# Patient Record
Sex: FEMALE | Race: BLACK OR AFRICAN AMERICAN
[De-identification: names, ages, dates, MRNs, and addresses within clinical notes are randomized per-mention and may not be internally consistent; named-entity substitution may affect disease eponyms.]

---

## 2023-05-10 ENCOUNTER — HOSPITAL ENCOUNTER (EMERGENCY)
Dept: HOSPITAL 26 - MED | Age: 23
Discharge: HOME | End: 2023-05-10
Payer: COMMERCIAL

## 2023-05-10 ENCOUNTER — HOSPITAL ENCOUNTER (EMERGENCY)
Dept: HOSPITAL 26 - MED | Age: 23
Discharge: LEFT BEFORE DISCHARGE | End: 2023-05-10
Payer: COMMERCIAL

## 2023-05-10 VITALS — DIASTOLIC BLOOD PRESSURE: 53 MMHG | SYSTOLIC BLOOD PRESSURE: 106 MMHG

## 2023-05-10 VITALS — SYSTOLIC BLOOD PRESSURE: 100 MMHG | DIASTOLIC BLOOD PRESSURE: 66 MMHG

## 2023-05-10 VITALS — WEIGHT: 131 LBS | HEIGHT: 68 IN | BODY MASS INDEX: 19.85 KG/M2

## 2023-05-10 VITALS — SYSTOLIC BLOOD PRESSURE: 100 MMHG | DIASTOLIC BLOOD PRESSURE: 62 MMHG

## 2023-05-10 VITALS — BODY MASS INDEX: 19.85 KG/M2 | WEIGHT: 131 LBS | HEIGHT: 68 IN

## 2023-05-10 VITALS — DIASTOLIC BLOOD PRESSURE: 66 MMHG | SYSTOLIC BLOOD PRESSURE: 100 MMHG

## 2023-05-10 DIAGNOSIS — R11.2: Primary | ICD-10-CM

## 2023-05-10 DIAGNOSIS — Z79.899: ICD-10-CM

## 2023-05-10 DIAGNOSIS — F12.90: ICD-10-CM

## 2023-05-10 DIAGNOSIS — R10.9: ICD-10-CM

## 2023-05-10 LAB
ALBUMIN FLD-MCNC: 4.4 G/DL (ref 3.4–5)
ANION GAP SERPL CALCULATED.3IONS-SCNC: 15.9 MMOL/L (ref 8–16)
AST SERPL-CCNC: 27 U/L (ref 15–37)
BASOPHILS # BLD AUTO: 0 K/UL (ref 0–0.22)
BASOPHILS NFR BLD AUTO: 0.3 % (ref 0–2)
BILIRUB SERPL-MCNC: 0.5 MG/DL (ref 0–1)
BUN SERPL-MCNC: 6 MG/DL (ref 7–18)
CHLORIDE SERPL-SCNC: 103 MMOL/L (ref 98–107)
CO2 SERPL-SCNC: 23.8 MMOL/L (ref 21–32)
CREAT SERPL-MCNC: 0.9 MG/DL (ref 0.6–1.3)
EOSINOPHIL # BLD AUTO: 0 K/UL (ref 0–0.4)
EOSINOPHIL NFR BLD AUTO: 0 % (ref 0–4)
ERYTHROCYTE [DISTWIDTH] IN BLOOD BY AUTOMATED COUNT: 14 % (ref 11.6–13.7)
GFR SERPL CREATININE-BSD FRML MDRD: 102 ML/MIN (ref 90–?)
GLUCOSE SERPL-MCNC: 99 MG/DL (ref 74–106)
HCT VFR BLD AUTO: 38.1 % (ref 36–48)
HGB BLD-MCNC: 12.8 G/DL (ref 12–16)
LIPASE SERPL-CCNC: 105 U/L (ref 73–393)
LYMPHOCYTES # BLD AUTO: 1.3 K/UL (ref 2.5–16.5)
LYMPHOCYTES NFR BLD AUTO: 14.5 % (ref 20.5–51.1)
MCH RBC QN AUTO: 30 PG (ref 27–31)
MCHC RBC AUTO-ENTMCNC: 34 G/DL (ref 33–37)
MCV RBC AUTO: 89.7 FL (ref 80–94)
MONOCYTES # BLD AUTO: 0.3 K/UL (ref 0.8–1)
MONOCYTES NFR BLD AUTO: 3.8 % (ref 1.7–9.3)
NEUTROPHILS # BLD AUTO: 7.4 K/UL (ref 1.8–7.7)
NEUTROPHILS NFR BLD AUTO: 81.4 % (ref 42.2–75.2)
PLATELET # BLD AUTO: 211 K/UL (ref 140–450)
POTASSIUM SERPL-SCNC: 3.7 MMOL/L (ref 3.5–5.1)
RBC # BLD AUTO: 4.25 MIL/UL (ref 4.2–5.4)
SODIUM SERPL-SCNC: 139 MMOL/L (ref 136–145)
WBC # BLD AUTO: 9.1 K/UL (ref 4.8–10.8)

## 2023-05-10 PROCEDURE — 80053 COMPREHEN METABOLIC PANEL: CPT

## 2023-05-10 PROCEDURE — 99284 EMERGENCY DEPT VISIT MOD MDM: CPT

## 2023-05-10 PROCEDURE — 36415 COLL VENOUS BLD VENIPUNCTURE: CPT

## 2023-05-10 PROCEDURE — 96374 THER/PROPH/DIAG INJ IV PUSH: CPT

## 2023-05-10 PROCEDURE — 85025 COMPLETE CBC W/AUTO DIFF WBC: CPT

## 2023-05-10 PROCEDURE — 96376 TX/PRO/DX INJ SAME DRUG ADON: CPT

## 2023-05-10 PROCEDURE — 96372 THER/PROPH/DIAG INJ SC/IM: CPT

## 2023-05-10 PROCEDURE — 83690 ASSAY OF LIPASE: CPT

## 2023-05-10 PROCEDURE — 96361 HYDRATE IV INFUSION ADD-ON: CPT

## 2023-05-10 RX ADMIN — LIDOCAINE HYDROCHLORIDE ONE ML: 20 SOLUTION ORAL at 03:22

## 2023-05-10 RX ADMIN — ONDANSETRON ONE MG: 4 TABLET, ORALLY DISINTEGRATING ORAL at 02:52

## 2023-05-10 RX ADMIN — LIDOCAINE HYDROCHLORIDE ONE MG: 20 SOLUTION ORAL; TOPICAL at 03:21

## 2023-05-10 RX ADMIN — Medication ONE MG: at 03:20

## 2023-05-10 NOTE — NUR
22 Y/O F presents with NV x4days with some constipation. pt is A&Ox4, skin 
intact, respirations even and unlabored. bedside, pt was vomiting. pt was seen 
here earlier around 0230 and was discharged by Dr. Benites. pt stated the NV is 
not getting better and she doesnt know why. 



[facility]-pt denies

nka

## 2023-05-10 NOTE — NUR
24 Y/O F presents with NV x4days and constipation xyesterday. pt is A&Ox4, skin 
intact, ambulatory without assistance, respirations even and unlabored. parents 
are bedside. 



[facility]-pt denies

NKA

## 2023-05-10 NOTE — NUR
Patient does not wish to proceed with medical care recommended by MD BESS.  
Patient given information related to possible complications, up to and 
including death, which could occur as a result of leaving hospital at this 
time.  Patient verbalizes understanding of risks involved leaving against 
medical advice.  Patient has signed AMA form.



LEFT W/O PAPERWORK

## 2023-05-10 NOTE — NUR
Patient discharged with v/s stable. Written and verbal after care instructions 
given and explained. 

Patient alert, oriented and verbalized understanding of instructions. 
Ambulatory with steady gait. All questions addressed prior to discharge. ID 
band removed. Patient advised to follow up with PMD. Rx of maalox, reglan, and 
zofran given.  Opportunity to ask questions provided and answered. Dr. Benites 
orders reinforced